# Patient Record
Sex: FEMALE | Race: ASIAN | NOT HISPANIC OR LATINO | URBAN - METROPOLITAN AREA
[De-identification: names, ages, dates, MRNs, and addresses within clinical notes are randomized per-mention and may not be internally consistent; named-entity substitution may affect disease eponyms.]

---

## 2022-02-25 ENCOUNTER — EMERGENCY (EMERGENCY)
Facility: HOSPITAL | Age: 62
LOS: 1 days | Discharge: HOME | End: 2022-02-25
Attending: EMERGENCY MEDICINE
Payer: MEDICAID

## 2022-02-25 VITALS
DIASTOLIC BLOOD PRESSURE: 81 MMHG | RESPIRATION RATE: 16 BRPM | HEART RATE: 80 BPM | OXYGEN SATURATION: 95 % | SYSTOLIC BLOOD PRESSURE: 134 MMHG | TEMPERATURE: 98 F | WEIGHT: 175.05 LBS | HEIGHT: 62 IN

## 2022-02-25 DIAGNOSIS — Z98.891 HISTORY OF UTERINE SCAR FROM PREVIOUS SURGERY: Chronic | ICD-10-CM

## 2022-02-25 DIAGNOSIS — Z90.49 ACQUIRED ABSENCE OF OTHER SPECIFIED PARTS OF DIGESTIVE TRACT: Chronic | ICD-10-CM

## 2022-02-25 LAB
APPEARANCE UR: CLEAR — SIGNIFICANT CHANGE UP
BACTERIA # UR AUTO: ABNORMAL /HPF
BILIRUB UR-MCNC: NEGATIVE — SIGNIFICANT CHANGE UP
COLOR SPEC: YELLOW — SIGNIFICANT CHANGE UP
DIFF PNL FLD: NEGATIVE — SIGNIFICANT CHANGE UP
EPI CELLS # UR: ABNORMAL /HPF
GLUCOSE UR QL: NEGATIVE — SIGNIFICANT CHANGE UP
KETONES UR-MCNC: NEGATIVE — SIGNIFICANT CHANGE UP
LEUKOCYTE ESTERASE UR-ACNC: ABNORMAL
NITRITE UR-MCNC: NEGATIVE — SIGNIFICANT CHANGE UP
PH UR: 6 — SIGNIFICANT CHANGE UP (ref 5–8)
PROT UR-MCNC: 15
RBC CASTS # UR COMP ASSIST: SIGNIFICANT CHANGE UP /HPF (ref 0–2)
SP GR SPEC: 1.01 — SIGNIFICANT CHANGE UP (ref 1.01–1.02)
UROBILINOGEN FLD QL: NEGATIVE — SIGNIFICANT CHANGE UP
WBC UR QL: ABNORMAL /HPF (ref 0–5)

## 2022-02-25 PROCEDURE — 81001 URINALYSIS AUTO W/SCOPE: CPT

## 2022-02-25 PROCEDURE — 87086 URINE CULTURE/COLONY COUNT: CPT

## 2022-02-25 PROCEDURE — 99283 EMERGENCY DEPT VISIT LOW MDM: CPT

## 2022-02-25 RX ORDER — CEPHALEXIN 500 MG
1 CAPSULE ORAL
Qty: 14 | Refills: 0
Start: 2022-02-25 | End: 2022-03-03

## 2022-02-25 NOTE — ED PROVIDER NOTE - OBJECTIVE STATEMENT
62 y/o female with a past medical history of hypertension, hyperlipidemia, diabetes and a surgical history of cholecystectomy and  presents for dysuria that started last night. Patient also noted she had dark urine that was normal and states she had recurrent UTIs and is unsure of what her normal antibiotic is. She denies any fever, chills, back pain, nausea and vomiting. Her daughter states she has noticed that patient has a painless "bump" on her upper abdomen and would like that to get looked at. NKDA.

## 2022-02-25 NOTE — ED PROVIDER NOTE - PHYSICAL EXAMINATION
Right upper quadrant reducible incisional hernia. Rest of exam is unremarkable. No CVA tenderness. Right upper quadrant abdominal reducible incisional hernia. No CVA tenderness.

## 2022-02-25 NOTE — ED PROVIDER NOTE - PATIENT PORTAL LINK FT
You can access the FollowMyHealth Patient Portal offered by [facility] by registering at the following website: [email or web address]. By joining [facility]’s FollowMyHealth portal, you will also be able to view your health information using other applications (apps) compatible with our system.

## 2022-02-25 NOTE — ED ADULT NURSE NOTE - OBJECTIVE STATEMENT
As per daughter patient has recurrent  burning upon urination .As per daughter she noticed bunp on the upper abdomen of patient and wants it checked. As per daughter patient has recurrent  burning upon urination c/o burning upon urination since last nightt ..As per daughter she noticed bump on the upper abdomen of patient and wants it checked. As per Gerson NP ,patient has reducible hernia to RUQ of abdomen .

## 2022-02-25 NOTE — ED PROVIDER NOTE - RESPIRATORY, MLM
Maintain catheter to josue suction drainage  Flush with 5 cc of sterile saline daily to prevent clogging   f/u results of culture  keep overlying dressing c/d/i Breath sounds clear and equal bilaterally.

## 2022-02-25 NOTE — ED PROVIDER NOTE - ATTENDING CONTRIBUTION TO CARE
seen with acp  c/o dysurian  also has swelling above cholecystectomy scar  that improves with lying down  abd reducible hernia noted  urine suggestive of uti  will start on keflex  agree with acps assessment hx and physical and disposition

## 2022-02-25 NOTE — ED PROVIDER NOTE - CLINICAL SUMMARY MEDICAL DECISION MAKING FREE TEXT BOX
62 y/o female presenting with urinary symptoms, likely incisional hernia and no evidence of incarceration. Will check UA and have patient follow up with her PCP for incisional hernia. Return precautions provided.

## 2022-02-25 NOTE — ED PROVIDER NOTE - PROGRESS NOTE DETAILS
UA noted, abx sent to pharmacy.  Patient nontoxic and medically stable for discharge. Results discussed with patient. Return precautions provided and patient understands to return to the ED for concerning or worsening signs and symptoms. Instructed to follow up with primary care physician and agreeable. Patient's questions answered.

## 2022-02-27 LAB
CULTURE RESULTS: SIGNIFICANT CHANGE UP
SPECIMEN SOURCE: SIGNIFICANT CHANGE UP